# Patient Record
Sex: FEMALE | Race: WHITE | Employment: UNEMPLOYED | URBAN - METROPOLITAN AREA
[De-identification: names, ages, dates, MRNs, and addresses within clinical notes are randomized per-mention and may not be internally consistent; named-entity substitution may affect disease eponyms.]

---

## 2019-04-01 ENCOUNTER — OFFICE VISIT (OUTPATIENT)
Dept: CARDIOLOGY CLINIC | Age: 39
End: 2019-04-01

## 2019-04-01 VITALS
DIASTOLIC BLOOD PRESSURE: 51 MMHG | HEIGHT: 66 IN | SYSTOLIC BLOOD PRESSURE: 117 MMHG | HEART RATE: 77 BPM | BODY MASS INDEX: 26.36 KG/M2 | WEIGHT: 164 LBS

## 2019-04-01 DIAGNOSIS — I47.1 SVT (SUPRAVENTRICULAR TACHYCARDIA) (HCC): ICD-10-CM

## 2019-04-01 DIAGNOSIS — Z01.810 PREOP CARDIOVASCULAR EXAM: ICD-10-CM

## 2019-04-01 DIAGNOSIS — R00.0 TACHYCARDIA: Primary | ICD-10-CM

## 2019-04-01 NOTE — PROGRESS NOTES
HISTORY OF PRESENT ILLNESS Nikhil Max is a 41 y.o. female. 4/19 History of tachycardia for many years but for the last 3 years or so, episodes come every 6 months and last less than 5 minutes. She was advised ablation in the remote past but has never gotten it. She tries to do vagal maneuvers and they help sometimes. Patient denies significant chest pain, SOB, edema, dizziness New Patient The history is provided by the patient. Pertinent negatives include no chest pain, no headaches and no shortness of breath. Palpitations The history is provided by the patient. This is a new problem. The current episode started more than 1 week ago (yrs, see comments). Pertinent negatives include no fever, no malaise/fatigue, no chest pain, no claudication, no orthopnea, no PND, no nausea, no vomiting, no headaches, no dizziness, no cough and no shortness of breath. Review of Systems Constitutional: Negative for chills, fever, malaise/fatigue and weight loss. HENT: Negative for nosebleeds. Eyes: Negative for discharge. Respiratory: Negative for cough, shortness of breath and wheezing. Cardiovascular: Positive for palpitations. Negative for chest pain, orthopnea, claudication, leg swelling and PND. Gastrointestinal: Negative for diarrhea, nausea and vomiting. Genitourinary: Negative for dysuria and hematuria. Musculoskeletal: Negative for joint pain. Skin: Negative for rash. Neurological: Negative for dizziness, seizures, loss of consciousness and headaches. Endo/Heme/Allergies: Negative for polydipsia. Does not bruise/bleed easily. Psychiatric/Behavioral: Negative for depression and substance abuse. The patient does not have insomnia. Allergies Allergen Reactions  Hydrocodone Itching Past Medical History:  
Diagnosis Date  ADD (attention deficit disorder with hyperactivity)   
 failed adderral  
 Depression   
 paxil and welbutrin  Murmur Family History Problem Relation Age of Onset  Other Mother   
     diverticulitis  Heart Attack Maternal Uncle  Heart Attack Maternal Grandmother  Heart Attack Maternal Uncle  Heart Attack Maternal Uncle  Heart Attack Maternal Uncle  Alcohol abuse Father Social History Tobacco Use  Smoking status: Never Smoker  Smokeless tobacco: Never Used Substance Use Topics  Alcohol use: Not Currently Comment: rare  Drug use: No  
  
 
Current Outpatient Medications Medication Sig  clonazepam (KLONOPIN) 0.5 mg tablet Take 1 Tab by mouth nightly as needed. No current facility-administered medications for this visit. No past surgical history on file. Visit Vitals /51 Pulse 77 Ht 5' 6\" (1.676 m) Wt 74.4 kg (164 lb) BMI 26.47 kg/m² Diagnostic Studies: 
I have reviewed the relevant tests done on the patient and show as follows EKG tracings reviewed by me today. EKG Results Procedure 720 Value Units Date/Time AMB POC EKG ROUTINE W/ 12 LEADS, INTER & REP [[id number]] Resulted:  04/01/19 1321 Order Status:  Completed Updated:  04/01/19 1328 XR Results (most recent): No results found for this or any previous visit. No flowsheet data found. Ms. Juan Pablo Lockett has a reminder for a \"due or due soon\" health maintenance. I have asked that she contact her primary care provider for follow-up on this health maintenance. Physical Exam  
Constitutional: She is oriented to person, place, and time. She appears well-developed and well-nourished. No distress. HENT:  
Head: Normocephalic and atraumatic. Mouth/Throat: Normal dentition. Eyes: Right eye exhibits no discharge. Left eye exhibits no discharge. No scleral icterus. Neck: Neck supple. No JVD present. Carotid bruit is not present. No thyromegaly present.   
Cardiovascular: Normal rate, regular rhythm, S1 normal, S2 normal, normal heart sounds and intact distal pulses. Exam reveals no gallop and no friction rub. No murmur heard. Pulmonary/Chest: Effort normal and breath sounds normal. She has no wheezes. She has no rales. Abdominal: Soft. She exhibits no mass. There is no tenderness. Musculoskeletal: She exhibits no edema. Lymphadenopathy:  
     Right cervical: No superficial cervical adenopathy present. Left cervical: No superficial cervical adenopathy present. Neurological: She is alert and oriented to person, place, and time. Skin: Skin is warm and dry. No rash noted. Psychiatric: She has a normal mood and affect. Her behavior is normal.  
 
 
ASSESSMENT and PLAN Cleared from cardiac view with the understanding that patient will have mild risk for this surgery. Patient has good functional capacity, normal EKG. She also tells me that his stress test was normal in 2015 approximately. Clinical exam is normal.  No further cardiac workup is recommended. She does have a history of SVT intermittently for which she has not taken any medication for a long time. If any SVT is noted in perioperative period, may consider IV adenosine. Diagnoses and all orders for this visit: 
 
1. Tachycardia -     AMB POC EKG ROUTINE W/ 12 LEADS, INTER & REP 2. SVT (supraventricular tachycardia) ([address] 3. Preop cardiovascular exam 
 
 
 
Pertinent laboratory and test data reviewed and discussed with patient. See patient instructions also for other medical advice given There are no discontinued medications. Follow-up and Dispositions · Return if symptoms worsen or fail to improve, for post test.

## 2019-09-20 PROBLEM — Z01.810 PREOP CARDIOVASCULAR EXAM: Status: RESOLVED | Noted: 2019-04-01 | Resolved: 2019-09-20

## 2020-10-02 ENCOUNTER — HOSPITAL ENCOUNTER (EMERGENCY)
Age: 40
Discharge: HOME | End: 2020-10-02
Attending: FAMILY MEDICINE
Payer: COMMERCIAL

## 2020-10-02 ENCOUNTER — APPOINTMENT (OUTPATIENT)
Dept: GENERAL RADIOLOGY | Age: 40
End: 2020-10-02
Attending: FAMILY MEDICINE
Payer: COMMERCIAL

## 2020-10-02 VITALS
HEART RATE: 107 BPM | SYSTOLIC BLOOD PRESSURE: 119 MMHG | WEIGHT: 160 LBS | RESPIRATION RATE: 18 BRPM | OXYGEN SATURATION: 100 % | DIASTOLIC BLOOD PRESSURE: 79 MMHG | HEIGHT: 66 IN | BODY MASS INDEX: 25.71 KG/M2 | TEMPERATURE: 98.2 F

## 2020-10-02 DIAGNOSIS — I47.1 SVT (SUPRAVENTRICULAR TACHYCARDIA) (HCC): Primary | ICD-10-CM

## 2020-10-02 LAB
ANION GAP SERPL CALC-SCNC: 9 MMOL/L
BASOPHILS # BLD: 0 K/UL
BASOPHILS NFR BLD: 0 %
BUN SERPL-MCNC: 10 MG/DL (ref 9–21)
BUN/CREAT SERPL: 14
CA-I BLD-MCNC: 9.7 MG/DL (ref 8.5–10.5)
CHLORIDE SERPL-SCNC: 110 MMOL/L (ref 94–111)
CO2 SERPL-SCNC: 21 MMOL/L (ref 21–33)
CREAT SERPL-MCNC: 0.7 MG/DL (ref 0.7–1.2)
EOSINOPHIL # BLD: 0 K/UL
EOSINOPHIL NFR BLD: 0 %
ERYTHROCYTE [DISTWIDTH] IN BLOOD BY AUTOMATED COUNT: 14.2 % (ref 11.6–14.5)
GLUCOSE SERPL-MCNC: 186 MG/DL (ref 70–110)
HCT VFR BLD AUTO: 41.4 % (ref 35–45)
HGB BLD-MCNC: 13.9 G/DL (ref 12–16)
IMM GRANULOCYTES # BLD AUTO: 0 K/UL
IMM GRANULOCYTES NFR BLD AUTO: 0 %
LYMPHOCYTES # BLD: 1.4 K/UL
LYMPHOCYTES NFR BLD: 6 %
MAGNESIUM SERPL-MCNC: 2.2 MG/DL (ref 1.7–2.8)
MCH RBC QN AUTO: 30.8 PG (ref 24–34)
MCHC RBC AUTO-ENTMCNC: 33.6 G/DL (ref 31–37)
MCV RBC AUTO: 91.6 FL (ref 74–97)
MONOCYTES # BLD: 1.8 K/UL
MONOCYTES NFR BLD: 8 %
NEUTS SEG # BLD: 19.4 K/UL
NEUTS SEG NFR BLD: 86 %
PLATELET # BLD AUTO: 313 K/UL (ref 135–420)
PMV BLD AUTO: 10.7 FL (ref 9.2–11.8)
POTASSIUM SERPL-SCNC: 4.3 MMOL/L (ref 3.2–5.1)
RBC # BLD AUTO: 4.52 M/UL (ref 4.2–5.3)
RBC MORPH BLD: ABNORMAL
SODIUM SERPL-SCNC: 140 MMOL/L (ref 135–145)
TROPONIN I SERPL-MCNC: 0.05 NG/ML (ref 0.02–0.05)
WBC # BLD AUTO: 22.6 K/UL (ref 4.6–13.2)

## 2020-10-02 PROCEDURE — 83735 ASSAY OF MAGNESIUM: CPT

## 2020-10-02 PROCEDURE — 84484 ASSAY OF TROPONIN QUANT: CPT

## 2020-10-02 PROCEDURE — 85025 COMPLETE CBC W/AUTO DIFF WBC: CPT

## 2020-10-02 PROCEDURE — 93005 ELECTROCARDIOGRAM TRACING: CPT

## 2020-10-02 PROCEDURE — 96374 THER/PROPH/DIAG INJ IV PUSH: CPT

## 2020-10-02 PROCEDURE — 99284 EMERGENCY DEPT VISIT MOD MDM: CPT

## 2020-10-02 PROCEDURE — 80048 BASIC METABOLIC PNL TOTAL CA: CPT

## 2020-10-02 PROCEDURE — 71045 X-RAY EXAM CHEST 1 VIEW: CPT

## 2020-10-02 PROCEDURE — 74011250636 HC RX REV CODE- 250/636: Performed by: FAMILY MEDICINE

## 2020-10-02 RX ORDER — ATENOLOL 25 MG/1
25 TABLET ORAL DAILY
Qty: 30 TAB | Refills: 0 | Status: SHIPPED | OUTPATIENT
Start: 2020-10-02 | End: 2020-10-02 | Stop reason: SDUPTHER

## 2020-10-02 RX ORDER — ALBUTEROL SULFATE 90 UG/1
2 AEROSOL, METERED RESPIRATORY (INHALATION)
COMMUNITY
Start: 2018-10-19

## 2020-10-02 RX ORDER — ATENOLOL 25 MG/1
25 TABLET ORAL DAILY
Qty: 30 TAB | Refills: 0 | Status: SHIPPED | OUTPATIENT
Start: 2020-10-02

## 2020-10-02 RX ORDER — ADENOSINE 3 MG/ML
12 INJECTION, SOLUTION INTRAVENOUS
Status: COMPLETED | OUTPATIENT
Start: 2020-10-02 | End: 2020-10-02

## 2020-10-02 RX ADMIN — SODIUM CHLORIDE 1000 ML: 9 INJECTION, SOLUTION INTRAVENOUS at 18:30

## 2020-10-02 RX ADMIN — ADENOSINE 12 MG: 3 INJECTION INTRAVENOUS at 18:29

## 2020-10-02 NOTE — ED PROVIDER NOTES
EMERGENCY DEPARTMENT HISTORY AND PHYSICAL EXAM      Date: 10/2/2020  Patient Name: Rosaura Serrano    History of Presenting Illness     Chief Complaint   Patient presents with    Irregular Heart Beat       History Provided By: Patient    HPI: Rosaura Serrano, 45 y.o. female with a past medical history significant SVT presents to the ED with cc of irregular heart beat. Pt states she was diagnosed with bronchitis yesterday and was given Prednisone. Today, pt notes her heart beat starting feeling irregular and fast, prompting her visit to the ED. Pt states she has had these episodes before, but notes this one lasted 5 hours. Pt also notes of associated light-headedness and dizziness that has since subsided PTA. Pt notes she was tested negative for COVID-19 as well. There are no other complaints, changes, or physical findings at this time. PCP: Gianluca Carter MD    Current Outpatient Medications   Medication Sig Dispense Refill    albuterol (PROVENTIL HFA, VENTOLIN HFA, PROAIR HFA) 90 mcg/actuation inhaler Take 2 Puffs by inhalation every six (6) hours as needed.  atenoloL (TENORMIN) 25 mg tablet Take 1 Tab by mouth daily. 30 Tab 0    clonazepam (KLONOPIN) 0.5 mg tablet Take 1 Tab by mouth nightly as needed. 15 Tab 1       Past History     Past Medical History:  Past Medical History:   Diagnosis Date    ADD (attention deficit disorder with hyperactivity)     failed adderral    Depression     paxil and welbutrin    Murmur        Past Surgical History:  History reviewed. No pertinent surgical history.     Family History:  Family History   Problem Relation Age of Onset    Other Mother         diverticulitis    Heart Attack Maternal Uncle     Heart Attack Maternal Grandmother     Heart Attack Maternal Uncle     Heart Attack Maternal Uncle     Heart Attack Maternal Uncle     Alcohol abuse Father        Social History:  Social History     Tobacco Use    Smoking status: Never Smoker    Smokeless tobacco: Never Used   Substance Use Topics    Alcohol use: Not Currently     Comment: rare    Drug use: No       Allergies: Allergies   Allergen Reactions    Hydrocodone Itching         Review of Systems     Review of Systems   Constitutional: Negative for diaphoresis, fatigue and fever. HENT: Negative for congestion, sinus pain and sore throat. Eyes: Negative for pain, redness and visual disturbance. Respiratory: Negative for cough, shortness of breath and wheezing. Cardiovascular: Positive for palpitations. Gastrointestinal: Negative for abdominal pain, diarrhea, nausea and vomiting. Endocrine: Negative for polydipsia, polyphagia and polyuria. Genitourinary: Negative for dysuria, frequency and hematuria. Musculoskeletal: Negative for back pain, neck pain and neck stiffness. Skin: Negative for color change and rash. Neurological: Positive for dizziness and light-headedness. Negative for weakness and headaches. Psychiatric/Behavioral: Negative for confusion. The patient is not hyperactive. Physical Exam     Physical Exam  Constitutional:       General: She is awake. Appearance: Normal appearance. She is well-developed, well-groomed and normal weight. HENT:      Head: Normocephalic and atraumatic. Eyes:      Extraocular Movements: Extraocular movements intact. Pupils: Pupils are equal, round, and reactive to light. Neck:      Musculoskeletal: Full passive range of motion without pain, normal range of motion and neck supple. Cardiovascular:      Rate and Rhythm: Tachycardia present. Heart sounds: Normal heart sounds. Pulmonary:      Effort: Pulmonary effort is normal.      Breath sounds: Normal breath sounds and air entry. Abdominal:      General: Abdomen is flat. Palpations: Abdomen is soft. Skin:     General: Skin is warm and dry. Neurological:      General: No focal deficit present.       Mental Status: She is alert and oriented to person, place, and time. Psychiatric:         Attention and Perception: Attention and perception normal.         Mood and Affect: Affect normal.         Speech: Speech normal.         Behavior: Behavior normal. Behavior is cooperative. Thought Content: Thought content normal.         Cognition and Memory: Cognition and memory normal.         Judgment: Judgment normal.         Diagnostic Study Results     Labs -  Recent Results (from the past 24 hour(s))   CBC WITH AUTOMATED DIFF    Collection Time: 10/02/20  6:30 PM   Result Value Ref Range    WBC 22.6 (H) 4.6 - 13.2 K/uL    RBC 4.52 4.20 - 5.30 M/uL    HGB 13.9 12.0 - 16.0 g/dL    HCT 41.4 35.0 - 45.0 %    MCV 91.6 74.0 - 97.0 FL    MCH 30.8 24.0 - 34.0 PG    MCHC 33.6 31.0 - 37.0 g/dL    RDW 14.2 11.6 - 14.5 %    PLATELET 631 804 - 151 K/uL    MPV 10.7 9.2 - 11.8 FL    NEUTROPHILS 86 %    LYMPHOCYTES 6 %    MONOCYTES 8 %    EOSINOPHILS 0 %    BASOPHILS 0 %    IMMATURE GRANULOCYTES 0 %    ABS. NEUTROPHILS 19.4 K/UL    ABS. LYMPHOCYTES 1.4 K/UL    ABS. MONOCYTES 1.8 K/UL    ABS. EOSINOPHILS 0.0 K/UL    ABS. BASOPHILS 0.0 K/UL    ABS. IMM.  GRANS. 0.0 K/UL    RBC COMMENTS Normocytic, Normochromic     METABOLIC PANEL, BASIC    Collection Time: 10/02/20  6:30 PM   Result Value Ref Range    Sodium 140 135 - 145 mmol/L    Potassium 4.3 3.2 - 5.1 mmol/L    Chloride 110 94 - 111 mmol/L    CO2 21 21 - 33 mmol/L    Anion gap 9 mmol/L    Glucose 186 (H) 70 - 110 mg/dL    BUN 10 9 - 21 mg/dL    Creatinine 0.70 0.70 - 1.20 mg/dL    BUN/Creatinine ratio 14      GFR est AA >60 ml/min/1.73m2    GFR est non-AA >60 ml/min/1.73m2    Calcium 9.7 8.5 - 10.5 mg/dL   TROPONIN I    Collection Time: 10/02/20  6:30 PM   Result Value Ref Range    Troponin-I, Qt. 0.05 0.02 - 0.05 ng/mL   MAGNESIUM    Collection Time: 10/02/20  6:30 PM   Result Value Ref Range    Magnesium 2.2 1.7 - 2.8 mg/dL     Radiologic Studies -   [unfilled]  CT Results  (Last 48 hours)    None        CXR Results  (Last 48 hours)    None          Medical Decision Making and ED Course   I am the first provider for this patient. I reviewed the vital signs, available nursing notes, past medical history, past surgical history, family history and social history. Vital Signs-Reviewed the patient's vital signs. No data found. EKG interpretation: (Preliminary)-1809  Rhythm: Supraventricular tachycardia; and regular . Rate (approx.): 195; Axis: normal; VA interval: normal; QRS interval: prolonged; ST/T wave: depressed; Other findings: SST depression, probably rate related. Records Reviewed: Nursing Notes and Old Medical Records    The patient presents with a differential diagnosis of SVT, anemai      Provider Notes (Medical Decision Making):     [facility]       ED Course:   Initial assessment performed. The patients presenting problems have been discussed, and they are in agreement with the care plan formulated and outlined with them. I have encouraged them to ask questions as they arise throughout their visit. 1930 -patient presented with 5 hours of palpitations. She has a history of SVT. Her heart rate was 195 on arrival.  She was in SVT. Vagal maneuvers were attempted without resolution of her symptoms. She was then given Adenocard 12 mg and she converted into a sinus tachycardia which further resolved into a normal sinus rhythm. Her lab evaluation was unremarkable. Since she has a previous history and is on no rate control meds she was given a prescription for atenolol. She needs a follow-up with her PCP. Procedures       Vladimir Howard MD  Chemical Cardioversion    Date/Time: 10/3/2020 10:16 AM  Performed by: Seth Nguyen MD  Authorized by: Seth Nguyen MD     Consent:     Consent obtained:  Verbal    Consent given by:  Patient    Risks discussed:  Pain  Indications:     Indications:  SVT  Anesthesia (see MAR for exact dosages):      Anesthesia method:  None  Post-procedure details:     Patient tolerance of procedure: Tolerated well, no immediate complications  Comments:      Pt was given Adenocard 12mg and her SVT converted to a Sinus Tachycardia. CRITICAL CARE NOTE :  6:35 PM  Amount of Critical Care Time: 30 minutes    IMPENDING DETERIORATION -Cardiovascular  ASSOCIATED RISK FACTORS - Dysrhythmia  MANAGEMENT- Bedside Assessment  INTERPRETATION -  ECG  INTERVENTIONS - cardioversion  TREATMENT RESPONSE -Improved  PERFORMED BY - Self    NOTES   :  I have spent critical care time involved in lab review, consultations with specialist, family decision- making, bedside attention and documentation. This time excludes time spent in any separate billed procedures. During this entire length of time I was immediately available to the patient . Rach Monique MD        Disposition       Discharged     DISCHARGE PLAN:  1. Start Atenolol. Current Discharge Medication List      CONTINUE these medications which have NOT CHANGED    Details   albuterol (PROVENTIL HFA, VENTOLIN HFA, PROAIR HFA) 90 mcg/actuation inhaler Take 2 Puffs by inhalation every six (6) hours as needed. clonazepam (KLONOPIN) 0.5 mg tablet Take 1 Tab by mouth nightly as needed. Qty: 15 Tab, Refills: 1    Associated Diagnoses: FH: CAD (coronary artery disease); Adjustment disorder; Stress           2. Follow-up Information     Follow up With Specialties Details Why Contact Info    Melva Diggs MD Family Medicine In 1 week  [address] [address]  [phone]          3. Return to ED if worse     Diagnosis     Clinical Impression:   1. SVT (supraventricular tachycardia) ([address]        Attestations:  By signing my name below, Jazmin Bolden, attest that this documentation has been prepared under the direction and in presence of Dr. Jay Jay Batista on 10/03/20.  Electronically signed: Kacy Gutierrez, 10/03/20, 6:41 PM      Rach Monique MD    Please note that this dictation was completed with Dragon, the computer voice recognition software. Quite often unanticipated grammatical, syntax, homophones, and other interpretive errors are inadvertently transcribed by the computer software. Please disregard these errors. Please excuse any errors that have escaped final proofreading. Thank you.

## 2020-10-02 NOTE — Clinical Note
[facility] [facility] EMERGENCY DEPT 
[facility] [id number] 
[phone] Work/School Note Date: 10/2/2020 To Whom It May concern: 
 
 
Jonn Bloch was seen and treated today in the emergency room by the following provider(s): 
Attending Provider: Ct Li MD. Jonn Bloch is excused from work/school on 10/02/20. She is clear to return to work/school on 10/03/20. Sincerely, Abhilash Chen MD

## 2020-10-02 NOTE — ED TRIAGE NOTES
Has had a cough, so saw PCP yesterday and was dx with bronchitis - negative for covid - and was given prednisone. Today has had a run of fast heart rate after taking the prednisone x 5 hours. States that she has a hx of tachycardia.

## 2020-10-04 LAB
ATRIAL RATE: 195 BPM
CALCULATED P AXIS, ECG09: 0 DEGREES
CALCULATED R AXIS, ECG10: 7 DEGREES
CALCULATED T AXIS, ECG11: 53 DEGREES
DIAGNOSIS, 93000: NORMAL
Q-T INTERVAL, ECG07: 267 MS
QRS DURATION, ECG06: 84 MS
QTC CALCULATION (BEZET), ECG08: 481 MS
VENTRICULAR RATE, ECG03: 195 BPM

## 2022-03-19 PROBLEM — R00.0 TACHYCARDIA: Status: ACTIVE | Noted: 2019-04-01

## 2022-03-20 PROBLEM — I47.1 SVT (SUPRAVENTRICULAR TACHYCARDIA) (HCC): Status: ACTIVE | Noted: 2019-04-01

## 2022-03-20 PROBLEM — I47.10 SVT (SUPRAVENTRICULAR TACHYCARDIA) (HCC): Status: ACTIVE | Noted: 2019-04-01

## 2023-08-31 ENCOUNTER — HOSPITAL ENCOUNTER (OUTPATIENT)
Facility: HOSPITAL | Age: 43
Discharge: HOME | End: 2023-08-31
Payer: COMMERCIAL

## 2023-08-31 DIAGNOSIS — N20.0 KIDNEY STONES: ICD-10-CM

## 2023-08-31 PROCEDURE — 74018 RADEX ABDOMEN 1 VIEW: CPT

## 2024-10-22 ENCOUNTER — OFFICE VISIT (OUTPATIENT)
Facility: CLINIC | Age: 44
End: 2024-10-22
Payer: COMMERCIAL

## 2024-10-22 ENCOUNTER — HOSPITAL ENCOUNTER (OUTPATIENT)
Facility: HOSPITAL | Age: 44
Discharge: HOME | End: 2024-10-25
Payer: COMMERCIAL

## 2024-10-22 VITALS
BODY MASS INDEX: 27.58 KG/M2 | RESPIRATION RATE: 16 BRPM | TEMPERATURE: 97.8 F | OXYGEN SATURATION: 99 % | SYSTOLIC BLOOD PRESSURE: 116 MMHG | HEIGHT: 66 IN | WEIGHT: 171.6 LBS | HEART RATE: 86 BPM | DIASTOLIC BLOOD PRESSURE: 76 MMHG

## 2024-10-22 DIAGNOSIS — F33.40 RECURRENT MAJOR DEPRESSIVE DISORDER, IN REMISSION (HCC): ICD-10-CM

## 2024-10-22 DIAGNOSIS — F41.9 ANXIETY: ICD-10-CM

## 2024-10-22 DIAGNOSIS — Z13.220 SCREENING FOR LIPID DISORDERS: ICD-10-CM

## 2024-10-22 DIAGNOSIS — R15.9 INCONTINENCE OF FECES, UNSPECIFIED FECAL INCONTINENCE TYPE: ICD-10-CM

## 2024-10-22 DIAGNOSIS — R15.9 INCONTINENCE OF FECES, UNSPECIFIED FECAL INCONTINENCE TYPE: Primary | ICD-10-CM

## 2024-10-22 DIAGNOSIS — Z76.89 ENCOUNTER TO ESTABLISH CARE: ICD-10-CM

## 2024-10-22 PROBLEM — Z87.442 HISTORY OF KIDNEY STONES: Status: ACTIVE | Noted: 2024-10-22

## 2024-10-22 PROBLEM — J45.20 MILD INTERMITTENT ASTHMA WITHOUT COMPLICATION: Status: ACTIVE | Noted: 2024-10-22

## 2024-10-22 PROBLEM — J45.30 MILD PERSISTENT ASTHMA WITHOUT COMPLICATION: Status: ACTIVE | Noted: 2024-10-22

## 2024-10-22 PROBLEM — K57.90 DIVERTICULOSIS: Status: ACTIVE | Noted: 2024-10-22

## 2024-10-22 LAB
ALBUMIN SERPL-MCNC: 3.9 G/DL (ref 3.4–5)
ALBUMIN/GLOB SERPL: 1 (ref 0.8–1.7)
ALP SERPL-CCNC: 41 U/L (ref 45–117)
ALT SERPL-CCNC: 24 U/L (ref 13–56)
ANION GAP SERPL CALC-SCNC: 4 MMOL/L (ref 3–18)
AST SERPL-CCNC: 15 U/L (ref 10–38)
BASOPHILS # BLD: 0.1 K/UL (ref 0–0.1)
BASOPHILS NFR BLD: 1 % (ref 0–2)
BILIRUB SERPL-MCNC: 0.5 MG/DL (ref 0.2–1)
BUN SERPL-MCNC: 11 MG/DL (ref 7–18)
BUN/CREAT SERPL: 15 (ref 12–20)
CALCIUM SERPL-MCNC: 9.6 MG/DL (ref 8.5–10.1)
CHLORIDE SERPL-SCNC: 104 MMOL/L (ref 100–111)
CHOLEST SERPL-MCNC: 223 MG/DL
CO2 SERPL-SCNC: 27 MMOL/L (ref 21–32)
CREAT SERPL-MCNC: 0.75 MG/DL (ref 0.6–1.3)
DIFFERENTIAL METHOD BLD: ABNORMAL
EOSINOPHIL # BLD: 0.5 K/UL (ref 0–0.4)
EOSINOPHIL NFR BLD: 7 % (ref 0–5)
ERYTHROCYTE [DISTWIDTH] IN BLOOD BY AUTOMATED COUNT: 15.1 % (ref 11.6–14.5)
GLOBULIN SER CALC-MCNC: 4.1 G/DL (ref 2–4)
GLUCOSE SERPL-MCNC: 95 MG/DL (ref 74–99)
HCT VFR BLD AUTO: 40.3 % (ref 35–45)
HDLC SERPL-MCNC: 60 MG/DL (ref 40–60)
HDLC SERPL: 3.7 (ref 0–5)
HGB BLD-MCNC: 12.7 G/DL (ref 12–16)
IMM GRANULOCYTES # BLD AUTO: 0 K/UL (ref 0–0.04)
IMM GRANULOCYTES NFR BLD AUTO: 0 % (ref 0–0.5)
LDLC SERPL CALC-MCNC: 141.6 MG/DL (ref 0–100)
LIPID PANEL: ABNORMAL
LYMPHOCYTES # BLD: 1.5 K/UL (ref 0.9–3.6)
LYMPHOCYTES NFR BLD: 22 % (ref 21–52)
MCH RBC QN AUTO: 28 PG (ref 24–34)
MCHC RBC AUTO-ENTMCNC: 31.5 G/DL (ref 31–37)
MCV RBC AUTO: 88.8 FL (ref 78–100)
MONOCYTES # BLD: 0.5 K/UL (ref 0.05–1.2)
MONOCYTES NFR BLD: 7 % (ref 3–10)
NEUTS SEG # BLD: 4.3 K/UL (ref 1.8–8)
NEUTS SEG NFR BLD: 63 % (ref 40–73)
NRBC # BLD: 0 K/UL (ref 0–0.01)
NRBC BLD-RTO: 0 PER 100 WBC
PLATELET # BLD AUTO: 290 K/UL (ref 135–420)
PMV BLD AUTO: 10.8 FL (ref 9.2–11.8)
POTASSIUM SERPL-SCNC: 4 MMOL/L (ref 3.5–5.5)
PROT SERPL-MCNC: 8 G/DL (ref 6.4–8.2)
RBC # BLD AUTO: 4.54 M/UL (ref 4.2–5.3)
SODIUM SERPL-SCNC: 135 MMOL/L (ref 136–145)
TRIGL SERPL-MCNC: 107 MG/DL
TSH SERPL DL<=0.05 MIU/L-ACNC: 1.91 UIU/ML (ref 0.36–3.74)
VLDLC SERPL CALC-MCNC: 21.4 MG/DL
WBC # BLD AUTO: 6.8 K/UL (ref 4.6–13.2)

## 2024-10-22 PROCEDURE — 36415 COLL VENOUS BLD VENIPUNCTURE: CPT

## 2024-10-22 PROCEDURE — 80053 COMPREHEN METABOLIC PANEL: CPT

## 2024-10-22 PROCEDURE — 85025 COMPLETE CBC W/AUTO DIFF WBC: CPT

## 2024-10-22 PROCEDURE — 80061 LIPID PANEL: CPT

## 2024-10-22 PROCEDURE — 99204 OFFICE O/P NEW MOD 45 MIN: CPT

## 2024-10-22 PROCEDURE — 84443 ASSAY THYROID STIM HORMONE: CPT

## 2024-10-22 SDOH — ECONOMIC STABILITY: FOOD INSECURITY: WITHIN THE PAST 12 MONTHS, THE FOOD YOU BOUGHT JUST DIDN'T LAST AND YOU DIDN'T HAVE MONEY TO GET MORE.: NEVER TRUE

## 2024-10-22 SDOH — ECONOMIC STABILITY: FOOD INSECURITY: WITHIN THE PAST 12 MONTHS, YOU WORRIED THAT YOUR FOOD WOULD RUN OUT BEFORE YOU GOT MONEY TO BUY MORE.: NEVER TRUE

## 2024-10-22 SDOH — ECONOMIC STABILITY: INCOME INSECURITY: HOW HARD IS IT FOR YOU TO PAY FOR THE VERY BASICS LIKE FOOD, HOUSING, MEDICAL CARE, AND HEATING?: NOT HARD AT ALL

## 2024-10-22 ASSESSMENT — PATIENT HEALTH QUESTIONNAIRE - PHQ9
SUM OF ALL RESPONSES TO PHQ9 QUESTIONS 1 & 2: 0
SUM OF ALL RESPONSES TO PHQ QUESTIONS 1-9: 0
SUM OF ALL RESPONSES TO PHQ QUESTIONS 1-9: 0
1. LITTLE INTEREST OR PLEASURE IN DOING THINGS: NOT AT ALL
2. FEELING DOWN, DEPRESSED OR HOPELESS: NOT AT ALL
SUM OF ALL RESPONSES TO PHQ QUESTIONS 1-9: 0
SUM OF ALL RESPONSES TO PHQ QUESTIONS 1-9: 0

## 2024-10-22 ASSESSMENT — ANXIETY QUESTIONNAIRES
3. WORRYING TOO MUCH ABOUT DIFFERENT THINGS: NOT AT ALL
IF YOU CHECKED OFF ANY PROBLEMS ON THIS QUESTIONNAIRE, HOW DIFFICULT HAVE THESE PROBLEMS MADE IT FOR YOU TO DO YOUR WORK, TAKE CARE OF THINGS AT HOME, OR GET ALONG WITH OTHER PEOPLE: NOT DIFFICULT AT ALL
4. TROUBLE RELAXING: NOT AT ALL
2. NOT BEING ABLE TO STOP OR CONTROL WORRYING: NOT AT ALL
5. BEING SO RESTLESS THAT IT IS HARD TO SIT STILL: NOT AT ALL
7. FEELING AFRAID AS IF SOMETHING AWFUL MIGHT HAPPEN: NOT AT ALL
1. FEELING NERVOUS, ANXIOUS, OR ON EDGE: NOT AT ALL
GAD7 TOTAL SCORE: 0
6. BECOMING EASILY ANNOYED OR IRRITABLE: NOT AT ALL

## 2024-10-22 ASSESSMENT — ENCOUNTER SYMPTOMS: SHORTNESS OF BREATH: 0

## 2024-10-22 NOTE — PROGRESS NOTES
Marilee Howe is a 44 y.o. female is seen on 10/22/2024 for New Patient, Weight Loss, and GI Problem    Assessment & Plan:     1. Incontinence of feces, unspecified fecal incontinence type  Assessment & Plan:  Chronic; worsening   Refer to gastroenterology for further evaluation   Labs ordered today   Awaiting results  Orders:  -     CBC with Auto Differential; Future  -     Comprehensive Metabolic Panel; Future  -     External Referral To Gastroenterology  2. Anxiety  Assessment & Plan:  Uncontrolled   Labs ordered today   Awaiting results   Will discuss potential treatment options during next office visit  Orders:  -     CBC with Auto Differential; Future  -     Comprehensive Metabolic Panel; Future  -     TSH; Future  3. Recurrent major depressive disorder, in remission (HCC)  Assessment & Plan:  Uncontrolled   Labs ordered today   Awaiting results   Will discuss potential treatment options during next office visit  Orders:  -     CBC with Auto Differential; Future  -     Comprehensive Metabolic Panel; Future  -     TSH; Future  4. Screening for lipid disorders  -     Lipid Panel; Future  5. Encounter to establish care    Follow-up and Dispositions    Return in about 2 weeks (around 11/5/2024).       Subjective:     HPI  Pt is here today to establish care and c/o fecal incontinence, abdominal pain, and would also like to discuss possible weight loss treatment   Pt reports a history diverticulosis, kidney stones, depression, and anxiety    Fecal Incontinence:   Started after having children   Reports history of anal sphincter repair; however, incontinence has been worsening over the last couple of months   Pt reports that certain foods can inconsistently cause abdominal pain and diarrhea  States that abdominal pain does improve after having bowel movement   Can correlate abdominal pain with certain foods; however, does reports symptoms of uncontrolled anxiety  Pt states that she has never been treated for

## 2024-10-22 NOTE — PROGRESS NOTES
Marilee Howe presents today for   Chief Complaint   Patient presents with    New Patient    Weight Loss    GI Problem       Current/Previous PCP/Specialists: Dr. Daniels  Previous PCP: Dr. Daniels  Reason for switching: Dr retired  Any speciality providers:     Is someone accompanying this pt? No    Is the patient using any DME equipment during OV? No    Depression Screening:      10/22/2024    10:29 AM   PHQ-9 Questionaire   Little interest or pleasure in doing things 0   Feeling down, depressed, or hopeless 0   PHQ-9 Total Score 0        BRADEN 7-Anxiety       10/22/2024    10:29 AM   BRADEN-7 SCREENING   Feeling nervous, anxious, or on edge Not at all   Not being able to stop or control worrying Not at all   Worrying too much about different things Not at all   Trouble relaxing Not at all   Being so restless that it is hard to sit still Not at all   Becoming easily annoyed or irritable Not at all   Feeling afraid as if something awful might happen Not at all   BRADEN-7 Total Score 0   How difficult have these problems made it for you to do your work, take care of things at home, or get along with other people? Not difficult at all        Travel Screening:    Travel Screening     No screening recorded since 10/21/24 0000       Travel History   Travel since 09/22/24    No documented travel since 09/22/24          Health Maintenance reviewed and discussed and ordered per Provider.  Transportation Needs: Unknown (10/22/2024)    PRAPARE - Transportation     Lack of Transportation (Medical): Not on file     Lack of Transportation (Non-Medical): No      Food Insecurity: No Food Insecurity (10/22/2024)    Hunger Vital Sign     Worried About Running Out of Food in the Last Year: Never true     Ran Out of Food in the Last Year: Never true     Financial Resource Strain: Low Risk  (10/22/2024)    Overall Financial Resource Strain (CARDIA)     Difficulty of Paying Living Expenses: Not hard at all     Housing Stability: Unknown

## 2024-10-23 PROBLEM — R15.9 INCONTINENCE OF FECES: Status: ACTIVE | Noted: 2024-10-23

## 2024-10-23 NOTE — ASSESSMENT & PLAN NOTE
Chronic; worsening   Refer to gastroenterology for further evaluation   Labs ordered today   Awaiting results

## 2024-10-23 NOTE — ASSESSMENT & PLAN NOTE
Uncontrolled   Labs ordered today   Awaiting results   Will discuss potential treatment options during next office visit

## 2024-10-25 ENCOUNTER — TELEPHONE (OUTPATIENT)
Age: 44
End: 2024-10-25

## 2024-10-25 NOTE — TELEPHONE ENCOUNTER
Received: Yesterday  Cedrick Kauffman MD Pinner, Robin; Evita Albrecht; Thelma Wood MA; Candida Obando RN; Marilee Moon LPN; 1 other  Chart reviewed.  Patient referred for fecal incontinence, change in bowel habits and diverticulosis.  Please schedule the patient for open access colonoscopy to evaluate the above.

## 2024-10-29 ENCOUNTER — OFFICE VISIT (OUTPATIENT)
Facility: CLINIC | Age: 44
End: 2024-10-29

## 2024-10-29 VITALS
HEIGHT: 66 IN | DIASTOLIC BLOOD PRESSURE: 81 MMHG | SYSTOLIC BLOOD PRESSURE: 117 MMHG | BODY MASS INDEX: 27.61 KG/M2 | TEMPERATURE: 97.6 F | OXYGEN SATURATION: 95 % | WEIGHT: 171.8 LBS | RESPIRATION RATE: 20 BRPM | HEART RATE: 90 BPM

## 2024-10-29 DIAGNOSIS — Z12.31 ENCOUNTER FOR SCREENING MAMMOGRAM FOR BREAST CANCER: ICD-10-CM

## 2024-10-29 DIAGNOSIS — E55.9 VITAMIN D DEFICIENCY: ICD-10-CM

## 2024-10-29 DIAGNOSIS — E78.00 PURE HYPERCHOLESTEROLEMIA: Primary | ICD-10-CM

## 2024-10-29 DIAGNOSIS — R15.9 INCONTINENCE OF FECES WITH FECAL URGENCY: ICD-10-CM

## 2024-10-29 DIAGNOSIS — R15.2 INCONTINENCE OF FECES WITH FECAL URGENCY: ICD-10-CM

## 2024-10-29 PROBLEM — R00.0 TACHYCARDIA: Status: RESOLVED | Noted: 2019-04-01 | Resolved: 2024-10-29

## 2024-10-29 RX ORDER — DOXYCYCLINE 100 MG/10ML
100 INJECTION, POWDER, LYOPHILIZED, FOR SOLUTION INTRAVENOUS DAILY
COMMUNITY
End: 2024-10-29

## 2024-10-29 SDOH — ECONOMIC STABILITY: FOOD INSECURITY: WITHIN THE PAST 12 MONTHS, THE FOOD YOU BOUGHT JUST DIDN'T LAST AND YOU DIDN'T HAVE MONEY TO GET MORE.: NEVER TRUE

## 2024-10-29 SDOH — ECONOMIC STABILITY: FOOD INSECURITY: WITHIN THE PAST 12 MONTHS, YOU WORRIED THAT YOUR FOOD WOULD RUN OUT BEFORE YOU GOT MONEY TO BUY MORE.: NEVER TRUE

## 2024-10-29 SDOH — ECONOMIC STABILITY: INCOME INSECURITY: HOW HARD IS IT FOR YOU TO PAY FOR THE VERY BASICS LIKE FOOD, HOUSING, MEDICAL CARE, AND HEATING?: NOT HARD AT ALL

## 2024-10-29 ASSESSMENT — PATIENT HEALTH QUESTIONNAIRE - PHQ9
SUM OF ALL RESPONSES TO PHQ QUESTIONS 1-9: 0
SUM OF ALL RESPONSES TO PHQ QUESTIONS 1-9: 0
2. FEELING DOWN, DEPRESSED OR HOPELESS: NOT AT ALL
SUM OF ALL RESPONSES TO PHQ9 QUESTIONS 1 & 2: 0
1. LITTLE INTEREST OR PLEASURE IN DOING THINGS: NOT AT ALL
SUM OF ALL RESPONSES TO PHQ QUESTIONS 1-9: 0
SUM OF ALL RESPONSES TO PHQ QUESTIONS 1-9: 0

## 2024-10-29 ASSESSMENT — ANXIETY QUESTIONNAIRES
4. TROUBLE RELAXING: NEARLY EVERY DAY
7. FEELING AFRAID AS IF SOMETHING AWFUL MIGHT HAPPEN: MORE THAN HALF THE DAYS
IF YOU CHECKED OFF ANY PROBLEMS ON THIS QUESTIONNAIRE, HOW DIFFICULT HAVE THESE PROBLEMS MADE IT FOR YOU TO DO YOUR WORK, TAKE CARE OF THINGS AT HOME, OR GET ALONG WITH OTHER PEOPLE: SOMEWHAT DIFFICULT
3. WORRYING TOO MUCH ABOUT DIFFERENT THINGS: SEVERAL DAYS
1. FEELING NERVOUS, ANXIOUS, OR ON EDGE: NOT AT ALL
5. BEING SO RESTLESS THAT IT IS HARD TO SIT STILL: NOT AT ALL
GAD7 TOTAL SCORE: 7
2. NOT BEING ABLE TO STOP OR CONTROL WORRYING: NOT AT ALL
6. BECOMING EASILY ANNOYED OR IRRITABLE: SEVERAL DAYS

## 2024-10-29 NOTE — PROGRESS NOTES
Marilee Howe presents today for   Chief Complaint   Patient presents with    Follow-up     Pt requesting to go over lab results         Is someone accompanying this pt? Yes/    Is the patient using any DME equipment during OV? no    Depression Screening:      10/29/2024     1:59 PM 10/22/2024    10:29 AM   PHQ-9 Questionaire   Little interest or pleasure in doing things 0 0   Feeling down, depressed, or hopeless 0 0   PHQ-9 Total Score 0 0        BRADEN 7-Anxiety       10/29/2024     1:59 PM 10/22/2024    10:29 AM   BRADEN-7 SCREENING   Feeling nervous, anxious, or on edge Not at all Not at all   Not being able to stop or control worrying Not at all Not at all   Worrying too much about different things Several days Not at all   Trouble relaxing Nearly every day Not at all   Being so restless that it is hard to sit still Not at all Not at all   Becoming easily annoyed or irritable Several days Not at all   Feeling afraid as if something awful might happen More than half the days Not at all   BRADEN-7 Total Score 7 0   How difficult have these problems made it for you to do your work, take care of things at home, or get along with other people? Somewhat difficult Not difficult at all        Travel Screening:    Travel Screening       Question Response    Have you been in contact with someone who was sick? No / Unsure    Do you have any of the following new or worsening symptoms? None of these    Have you traveled internationally or domestically in the last month? No          Travel History   Travel since 09/29/24    No documented travel since 09/29/24          Health Maintenance reviewed and discussed and ordered per Provider.  Transportation Needs: Unknown (10/29/2024)    PRAPARE - Transportation     Lack of Transportation (Medical): Not on file     Lack of Transportation (Non-Medical): No      Food Insecurity: No Food Insecurity (10/29/2024)    Hunger Vital Sign     Worried About Running Out of Food in the Last 
Antonio.  At this time, likely IBS.  Will try FOBMAP diet while waiting for GI.  Referral given again to specific provider requested by pt.  Advised pt to try pelvic floor exercise as well to help.    Orders:  -     External Referral To Gastroenterology    Orders Placed This Encounter   Procedures    DAVID JOÃO DIGITAL SCREEN BILATERAL [[id number]]     Standing Status:   Future     Standing Expiration Date:   10/29/2025     Order Specific Question:   Reason for exam:     Answer:   breast cancer screen    Vitamin D 25 Hydroxy     Standing Status:   Future     Standing Expiration Date:   10/29/2025    External Referral To Gastroenterology     Referral Priority:   Routine     Referral Type:   Eval and Treat     Referral Reason:   Specialty Services Required     Requested Specialty:   Gastroenterology     Number of Visits Requested:   1       Return in about 6 months (around 4/29/2025), or lab work, for extended visit 30 minutes.      I have discussed the diagnosis with the patient and the intended plan as seen in the above orders.  The patient has received an after-visit summary and questions were answered concerning future plans.  I have discussed medication side effects and warnings with the patient as well. I have reviewed the plan of care with the patient, accepted their input and they are in agreement with the treatment goals. Previous lab and imaging results were reviewed by me.     On this date 10/29/24 I have spent 45 minutes reviewing previous notes, test results and face to face with the patient for interview/exam, discussing working diagnosis and treatment plan as well as documenting on the day of the visit.       Parish Jamison MD  October 29, 2024

## 2024-10-29 NOTE — ASSESSMENT & PLAN NOTE
Pt was given referral by Antonio Villa.  At this time, likely IBS.  Will try FOBMAP diet while waiting for GI.  Referral given again to specific provider requested by pt.  Advised pt to try pelvic floor exercise as well to help.

## 2024-10-29 NOTE — ASSESSMENT & PLAN NOTE
Total chol 223, , HDL 60.  ASCVD risk of less than 1% over 10 years.  Diet and exercise for now.  Recommend psyllium fiber daily (Metamucil).  Will repeat lipid in 6 months.

## 2024-10-29 NOTE — ASSESSMENT & PLAN NOTE
Previously has vitamin D that was low.  No recent lab.  She is not on supplement.  Will repeat lab to check current level.  If low; will send supplement.

## 2024-10-30 ENCOUNTER — HOSPITAL ENCOUNTER (OUTPATIENT)
Facility: HOSPITAL | Age: 44
Discharge: HOME | End: 2024-11-02
Payer: COMMERCIAL

## 2024-10-30 DIAGNOSIS — E55.9 VITAMIN D DEFICIENCY: ICD-10-CM

## 2024-10-30 LAB — 25(OH)D3 SERPL-MCNC: 27.3 NG/ML (ref 30–100)

## 2024-10-30 PROCEDURE — 82306 VITAMIN D 25 HYDROXY: CPT

## 2024-10-30 PROCEDURE — 36415 COLL VENOUS BLD VENIPUNCTURE: CPT

## 2024-11-01 ENCOUNTER — OFFICE VISIT (OUTPATIENT)
Facility: CLINIC | Age: 44
End: 2024-11-01
Payer: COMMERCIAL

## 2024-11-01 DIAGNOSIS — E78.00 PURE HYPERCHOLESTEROLEMIA: Primary | ICD-10-CM

## 2024-11-01 DIAGNOSIS — Z71.3 WEIGHT LOSS COUNSELING, ENCOUNTER FOR: ICD-10-CM

## 2024-11-01 DIAGNOSIS — E66.3 OVERWEIGHT (BMI 25.0-29.9): Chronic | ICD-10-CM

## 2024-11-01 PROCEDURE — 99214 OFFICE O/P EST MOD 30 MIN: CPT | Performed by: STUDENT IN AN ORGANIZED HEALTH CARE EDUCATION/TRAINING PROGRAM

## 2024-11-01 RX ORDER — TIRZEPATIDE 2.5 MG/.5ML
2.5 INJECTION, SOLUTION SUBCUTANEOUS WEEKLY
Qty: 2 ML | Refills: 0 | Status: SHIPPED | OUTPATIENT
Start: 2024-11-01 | End: 2024-11-29

## 2024-11-01 SDOH — ECONOMIC STABILITY: FOOD INSECURITY: WITHIN THE PAST 12 MONTHS, YOU WORRIED THAT YOUR FOOD WOULD RUN OUT BEFORE YOU GOT MONEY TO BUY MORE.: NEVER TRUE

## 2024-11-01 SDOH — ECONOMIC STABILITY: FOOD INSECURITY: WITHIN THE PAST 12 MONTHS, THE FOOD YOU BOUGHT JUST DIDN'T LAST AND YOU DIDN'T HAVE MONEY TO GET MORE.: NEVER TRUE

## 2024-11-01 SDOH — ECONOMIC STABILITY: INCOME INSECURITY: HOW HARD IS IT FOR YOU TO PAY FOR THE VERY BASICS LIKE FOOD, HOUSING, MEDICAL CARE, AND HEATING?: NOT HARD AT ALL

## 2024-11-01 ASSESSMENT — PATIENT HEALTH QUESTIONNAIRE - PHQ9
SUM OF ALL RESPONSES TO PHQ QUESTIONS 1-9: 0
7. TROUBLE CONCENTRATING ON THINGS, SUCH AS READING THE NEWSPAPER OR WATCHING TELEVISION: NOT AT ALL
SUM OF ALL RESPONSES TO PHQ QUESTIONS 1-9: 0
3. TROUBLE FALLING OR STAYING ASLEEP: NOT AT ALL
1. LITTLE INTEREST OR PLEASURE IN DOING THINGS: NOT AT ALL
SUM OF ALL RESPONSES TO PHQ QUESTIONS 1-9: 0
10. IF YOU CHECKED OFF ANY PROBLEMS, HOW DIFFICULT HAVE THESE PROBLEMS MADE IT FOR YOU TO DO YOUR WORK, TAKE CARE OF THINGS AT HOME, OR GET ALONG WITH OTHER PEOPLE: NOT DIFFICULT AT ALL
8. MOVING OR SPEAKING SO SLOWLY THAT OTHER PEOPLE COULD HAVE NOTICED. OR THE OPPOSITE, BEING SO FIGETY OR RESTLESS THAT YOU HAVE BEEN MOVING AROUND A LOT MORE THAN USUAL: NOT AT ALL
5. POOR APPETITE OR OVEREATING: NOT AT ALL
2. FEELING DOWN, DEPRESSED OR HOPELESS: NOT AT ALL
6. FEELING BAD ABOUT YOURSELF - OR THAT YOU ARE A FAILURE OR HAVE LET YOURSELF OR YOUR FAMILY DOWN: NOT AT ALL
9. THOUGHTS THAT YOU WOULD BE BETTER OFF DEAD, OR OF HURTING YOURSELF: NOT AT ALL
4. FEELING TIRED OR HAVING LITTLE ENERGY: NOT AT ALL
SUM OF ALL RESPONSES TO PHQ QUESTIONS 1-9: 0
SUM OF ALL RESPONSES TO PHQ9 QUESTIONS 1 & 2: 0

## 2024-11-01 ASSESSMENT — ANXIETY QUESTIONNAIRES
4. TROUBLE RELAXING: NOT AT ALL
GAD7 TOTAL SCORE: 0
IF YOU CHECKED OFF ANY PROBLEMS ON THIS QUESTIONNAIRE, HOW DIFFICULT HAVE THESE PROBLEMS MADE IT FOR YOU TO DO YOUR WORK, TAKE CARE OF THINGS AT HOME, OR GET ALONG WITH OTHER PEOPLE: NOT DIFFICULT AT ALL
1. FEELING NERVOUS, ANXIOUS, OR ON EDGE: NOT AT ALL
2. NOT BEING ABLE TO STOP OR CONTROL WORRYING: NOT AT ALL
7. FEELING AFRAID AS IF SOMETHING AWFUL MIGHT HAPPEN: NOT AT ALL
6. BECOMING EASILY ANNOYED OR IRRITABLE: NOT AT ALL
3. WORRYING TOO MUCH ABOUT DIFFERENT THINGS: NOT AT ALL
5. BEING SO RESTLESS THAT IT IS HARD TO SIT STILL: NOT AT ALL

## 2024-11-01 NOTE — PROGRESS NOTES
Marilee Howe presents today for   Chief Complaint   Patient presents with    Discuss Medications         Is someone accompanying this pt? no    Is the patient using any DME equipment during OV? no    Depression Screenin/1/2024     8:28 AM 10/29/2024     1:59 PM 10/22/2024    10:29 AM   PHQ-9 Questionaire   Little interest or pleasure in doing things 0 0 0   Feeling down, depressed, or hopeless 0 0 0   Trouble falling or staying asleep, or sleeping too much 0     Feeling tired or having little energy 0     Poor appetite or overeating 0     Feeling bad about yourself - or that you are a failure or have let yourself or your family down 0     Trouble concentrating on things, such as reading the newspaper or watching television 0     Moving or speaking so slowly that other people could have noticed. Or the opposite - being so fidgety or restless that you have been moving around a lot more than usual 0     Thoughts that you would be better off dead, or of hurting yourself in some way 0     PHQ-9 Total Score 0 0 0   If you checked off any problems, how difficult have these problems made it for you to do your work, take care of things at home, or get along with other people? 0          BRADEN 7-Anxiety       2024     8:29 AM 10/29/2024     1:59 PM 10/22/2024    10:29 AM   BRADEN-7 SCREENING   Feeling nervous, anxious, or on edge Not at all Not at all Not at all   Not being able to stop or control worrying Not at all Not at all Not at all   Worrying too much about different things Not at all Several days Not at all   Trouble relaxing Not at all Nearly every day Not at all   Being so restless that it is hard to sit still Not at all Not at all Not at all   Becoming easily annoyed or irritable Not at all Several days Not at all   Feeling afraid as if something awful might happen Not at all More than half the days Not at all   BRADEN-7 Total Score 0 7 0   How difficult have these problems made it for you to do your

## 2024-11-01 NOTE — PROGRESS NOTES
Chronic Disease Follow up    Marilee Howe (: 1980) is a 44 y.o. female here for evaluation of the following chief concerns(s):  Discuss Medications (Weight loss medication )       ASSESSMENT/PLAN:  1. Pure hypercholesterolemia  Assessment & Plan:   Pt tolerating Metamucil well and is change diet to help.  Pt interest in Zepbound as she has tried gym and is at a wall with no improvement.  Adding zepbound to help, hoping that it will cover as she has good Rx coupon for 3 months trial with $25/month copay.    Orders:  -     tirzepatide-weight management (ZEPBOUND) 2.5 MG/0.5ML SOAJ subCUTAneous auto-injector pen; Inject 2.5 mg into the skin once a week for 28 days, Disp-2 mL, R-0Normal  2. Overweight (BMI 25.0-29.9)  Assessment & Plan:   In addition to diet and exercise pt would like to try Zepbound.  Will see if pt can get it..  Prescription given for 2.5 mg starting dose.  F/u in 4 weeks after able to get medication.   Orders:  -     tirzepatide-weight management (ZEPBOUND) 2.5 MG/0.5ML SOAJ subCUTAneous auto-injector pen; Inject 2.5 mg into the skin once a week for 28 days, Disp-2 mL, R-0Normal  3. Weight loss counseling, encounter for  Assessment & Plan:   Counseled dietary changes as it is the foundation of weight issue.  Pt is adjusting and would like Zepbound to get her to desired weight.  She is hitting a wall with diet and exercise.    Orders:  -     tirzepatide-weight management (ZEPBOUND) 2.5 MG/0.5ML SOAJ subCUTAneous auto-injector pen; Inject 2.5 mg into the skin once a week for 28 days, Disp-2 mL, R-0Normal    No orders of the defined types were placed in this encounter.      Return in about 4 weeks (around 2024), or as need for medication adjustment.    Patient agrees with plan as above and has no additional questions at this time.     SUBJECTIVE/OBJECTIVE:    Patient presents for follow up chronic disease     Overweight and high cholesterol.  Pt have tried diet and exercise and

## 2024-11-01 NOTE — ASSESSMENT & PLAN NOTE
In addition to diet and exercise pt would like to try Zepbound.  Will see if pt can get it..  Prescription given for 2.5 mg starting dose.  F/u in 4 weeks after able to get medication.

## 2024-11-01 NOTE — ASSESSMENT & PLAN NOTE
Counseled dietary changes as it is the foundation of weight issue.  Pt is adjusting and would like Zepbound to get her to desired weight.  She is hitting a wall with diet and exercise.

## 2024-11-01 NOTE — ASSESSMENT & PLAN NOTE
Pt tolerating Metamucil well and is change diet to help.  Pt interest in Zepbound as she has tried gym and is at a wall with no improvement.  Adding zepbound to help, hoping that it will cover as she has good Rx coupon for 3 months trial with $25/month copay.

## 2024-11-05 ENCOUNTER — TELEPHONE (OUTPATIENT)
Facility: CLINIC | Age: 44
End: 2024-11-05

## 2024-11-05 NOTE — TELEPHONE ENCOUNTER
Returned Mrs. Howe call verified pt by name and . Mrs. Howe stated that she has been working out at the gym, has seen seen by a bariatric clinic, all while having a body scan done with no successful weight loss results. I asked Mrs. Howe if she could call her insurance and ask which weight loss medication they will pay for and what are the requirements for the pt to get weight loss medication.  Mrs. Howe states that she will call her insurance and give our office a call once she get this information from her insurance.

## 2024-11-11 ENCOUNTER — HOSPITAL ENCOUNTER (OUTPATIENT)
Age: 44
Discharge: HOME | End: 2024-11-14
Payer: COMMERCIAL

## 2024-11-11 VITALS — HEIGHT: 66 IN | BODY MASS INDEX: 27.48 KG/M2 | WEIGHT: 171 LBS

## 2024-11-11 DIAGNOSIS — Z12.31 ENCOUNTER FOR SCREENING MAMMOGRAM FOR BREAST CANCER: ICD-10-CM

## 2024-11-11 PROCEDURE — 77063 BREAST TOMOSYNTHESIS BI: CPT

## 2024-11-12 DIAGNOSIS — E78.00 PURE HYPERCHOLESTEROLEMIA: ICD-10-CM

## 2024-11-12 DIAGNOSIS — E66.3 OVERWEIGHT (BMI 25.0-29.9): Chronic | ICD-10-CM

## 2024-11-12 DIAGNOSIS — Z71.3 WEIGHT LOSS COUNSELING, ENCOUNTER FOR: ICD-10-CM

## 2024-11-12 RX ORDER — SEMAGLUTIDE 0.5 MG/.5ML
0.5 INJECTION, SOLUTION SUBCUTANEOUS
Qty: 2 ML | Refills: 0 | Status: SHIPPED | OUTPATIENT
Start: 2024-11-12 | End: 2024-12-10

## 2024-11-18 DIAGNOSIS — R92.8 ABNORMAL MAMMOGRAM OF RIGHT BREAST: Primary | ICD-10-CM

## 2024-11-25 ENCOUNTER — OFFICE VISIT (OUTPATIENT)
Facility: CLINIC | Age: 44
End: 2024-11-25
Payer: COMMERCIAL

## 2024-11-25 VITALS
RESPIRATION RATE: 20 BRPM | DIASTOLIC BLOOD PRESSURE: 78 MMHG | HEART RATE: 73 BPM | SYSTOLIC BLOOD PRESSURE: 113 MMHG | BODY MASS INDEX: 27.26 KG/M2 | OXYGEN SATURATION: 96 % | WEIGHT: 169.6 LBS | HEIGHT: 66 IN | TEMPERATURE: 97.2 F

## 2024-11-25 DIAGNOSIS — E78.00 PURE HYPERCHOLESTEROLEMIA: Primary | ICD-10-CM

## 2024-11-25 DIAGNOSIS — Z71.3 WEIGHT LOSS COUNSELING, ENCOUNTER FOR: ICD-10-CM

## 2024-11-25 DIAGNOSIS — E66.3 OVERWEIGHT (BMI 25.0-29.9): Chronic | ICD-10-CM

## 2024-11-25 PROCEDURE — 99213 OFFICE O/P EST LOW 20 MIN: CPT | Performed by: STUDENT IN AN ORGANIZED HEALTH CARE EDUCATION/TRAINING PROGRAM

## 2024-11-25 SDOH — ECONOMIC STABILITY: FOOD INSECURITY: WITHIN THE PAST 12 MONTHS, YOU WORRIED THAT YOUR FOOD WOULD RUN OUT BEFORE YOU GOT MONEY TO BUY MORE.: NEVER TRUE

## 2024-11-25 SDOH — ECONOMIC STABILITY: FOOD INSECURITY: WITHIN THE PAST 12 MONTHS, THE FOOD YOU BOUGHT JUST DIDN'T LAST AND YOU DIDN'T HAVE MONEY TO GET MORE.: NEVER TRUE

## 2024-11-25 SDOH — ECONOMIC STABILITY: INCOME INSECURITY: HOW HARD IS IT FOR YOU TO PAY FOR THE VERY BASICS LIKE FOOD, HOUSING, MEDICAL CARE, AND HEATING?: NOT HARD AT ALL

## 2024-11-25 ASSESSMENT — PATIENT HEALTH QUESTIONNAIRE - PHQ9
5. POOR APPETITE OR OVEREATING: NOT AT ALL
6. FEELING BAD ABOUT YOURSELF - OR THAT YOU ARE A FAILURE OR HAVE LET YOURSELF OR YOUR FAMILY DOWN: NOT AT ALL
SUM OF ALL RESPONSES TO PHQ QUESTIONS 1-9: 0
7. TROUBLE CONCENTRATING ON THINGS, SUCH AS READING THE NEWSPAPER OR WATCHING TELEVISION: NOT AT ALL
1. LITTLE INTEREST OR PLEASURE IN DOING THINGS: NOT AT ALL
SUM OF ALL RESPONSES TO PHQ QUESTIONS 1-9: 0
SUM OF ALL RESPONSES TO PHQ9 QUESTIONS 1 & 2: 0
3. TROUBLE FALLING OR STAYING ASLEEP: NOT AT ALL
4. FEELING TIRED OR HAVING LITTLE ENERGY: NOT AT ALL
10. IF YOU CHECKED OFF ANY PROBLEMS, HOW DIFFICULT HAVE THESE PROBLEMS MADE IT FOR YOU TO DO YOUR WORK, TAKE CARE OF THINGS AT HOME, OR GET ALONG WITH OTHER PEOPLE: NOT DIFFICULT AT ALL
SUM OF ALL RESPONSES TO PHQ QUESTIONS 1-9: 0
2. FEELING DOWN, DEPRESSED OR HOPELESS: NOT AT ALL
8. MOVING OR SPEAKING SO SLOWLY THAT OTHER PEOPLE COULD HAVE NOTICED. OR THE OPPOSITE, BEING SO FIGETY OR RESTLESS THAT YOU HAVE BEEN MOVING AROUND A LOT MORE THAN USUAL: NOT AT ALL
9. THOUGHTS THAT YOU WOULD BE BETTER OFF DEAD, OR OF HURTING YOURSELF: NOT AT ALL
SUM OF ALL RESPONSES TO PHQ QUESTIONS 1-9: 0

## 2024-11-25 ASSESSMENT — ANXIETY QUESTIONNAIRES
GAD7 TOTAL SCORE: 0
4. TROUBLE RELAXING: NOT AT ALL
3. WORRYING TOO MUCH ABOUT DIFFERENT THINGS: NOT AT ALL
2. NOT BEING ABLE TO STOP OR CONTROL WORRYING: NOT AT ALL
7. FEELING AFRAID AS IF SOMETHING AWFUL MIGHT HAPPEN: NOT AT ALL
6. BECOMING EASILY ANNOYED OR IRRITABLE: NOT AT ALL
5. BEING SO RESTLESS THAT IT IS HARD TO SIT STILL: NOT AT ALL
1. FEELING NERVOUS, ANXIOUS, OR ON EDGE: NOT AT ALL
IF YOU CHECKED OFF ANY PROBLEMS ON THIS QUESTIONNAIRE, HOW DIFFICULT HAVE THESE PROBLEMS MADE IT FOR YOU TO DO YOUR WORK, TAKE CARE OF THINGS AT HOME, OR GET ALONG WITH OTHER PEOPLE: NOT DIFFICULT AT ALL

## 2024-11-25 NOTE — ASSESSMENT & PLAN NOTE
Pt able to get Wegovy and on 0.25 mg/wk and tolerating well, but struggling to get next dose.  Discussed diet.   diet and exercise.  Return in 3 months for status.

## 2024-11-25 NOTE — PROGRESS NOTES
Chronic Disease Follow up    Marilee Howe (: 1980) is a 44 y.o. female here for evaluation of the following chief concerns(s):  Follow-up (4 week f/u, weight management )       ASSESSMENT/PLAN:  1. Pure hypercholesterolemia  Assessment & Plan:   Pt tolerating Metamucil well and is change diet to help.  Pt able to get Wegovy and have been on the 0.25 mg.  Tolerating well with minor nausea.  Transitioning to 0.5 mg weekly, however, there is no supply for  0.5 mg dose.   Discuss option of higher dose, 1mg.  Pt would like to try.  She will check supply with different pharmacy and let us know.   Will check her lipid in 6 months. Continue with Wegovy.   Orders:  -     Semaglutide-Weight Management (WEGOVY) 1 MG/0.5ML SOAJ SC injection; Inject 1 mg into the skin every 7 days, Disp-4 mL, R-0Normal  2. Overweight (BMI 25.0-29.9)  Assessment & Plan:  Pt able to get Wegovy and on 0.25 mg/wk and tolerating well, but struggling to get next dose.  Discussed diet.   diet and exercise.  Return in 3 months for status.   Orders:  -     Semaglutide-Weight Management (WEGOVY) 1 MG/0.5ML SOAJ SC injection; Inject 1 mg into the skin every 7 days, Disp-4 mL, R-0Normal  3. Weight loss counseling, encounter for  Assessment & Plan:   Counseled dietary changes as it is the foundation of weight issue.  And that even if she is on Wegovy, she needs to change lifestyle to ensure minimal rebound when medication stopped when she reach target weight and cholesterol.    Orders:  -     Semaglutide-Weight Management (WEGOVY) 1 MG/0.5ML SOAJ SC injection; Inject 1 mg into the skin every 7 days, Disp-4 mL, R-0Normal    No orders of the defined types were placed in this encounter.      Return in about 3 months (around 2025), or Weight counseling and Wegovy adjustment.    Patient agrees with plan as above and has no additional questions at this time.     SUBJECTIVE/OBJECTIVE:    Patient presents for follow up chronic disease

## 2024-11-25 NOTE — ASSESSMENT & PLAN NOTE
Pt tolerating Metamucil well and is change diet to help.  Pt able to get Wegovy and have been on the 0.25 mg.  Tolerating well with minor nausea.  Transitioning to 0.5 mg weekly, however, there is no supply for  0.5 mg dose.   Discuss option of higher dose, 1mg.  Pt would like to try.  She will check supply with different pharmacy and let us know.   Will check her lipid in 6 months. Continue with Wegovy.

## 2024-11-25 NOTE — ASSESSMENT & PLAN NOTE
Counseled dietary changes as it is the foundation of weight issue.  And that even if she is on Wegovy, she needs to change lifestyle to ensure minimal rebound when medication stopped when she reach target weight and cholesterol.

## 2024-11-25 NOTE — PROGRESS NOTES
Marilee Howe presents today for   Chief Complaint   Patient presents with    Follow-up     4 week f/u         Is someone accompanying this pt? Yes/spouse    Is the patient using any DME equipment during OV? no    Depression Screenin/25/2024     8:39 AM 2024     8:28 AM 10/29/2024     1:59 PM 10/22/2024    10:29 AM   PHQ-9 Questionaire   Little interest or pleasure in doing things 0 0 0 0   Feeling down, depressed, or hopeless 0 0 0 0   Trouble falling or staying asleep, or sleeping too much 0 0     Feeling tired or having little energy 0 0     Poor appetite or overeating 0 0     Feeling bad about yourself - or that you are a failure or have let yourself or your family down 0 0     Trouble concentrating on things, such as reading the newspaper or watching television 0 0     Moving or speaking so slowly that other people could have noticed. Or the opposite - being so fidgety or restless that you have been moving around a lot more than usual 0 0     Thoughts that you would be better off dead, or of hurting yourself in some way 0 0     PHQ-9 Total Score 0 0 0 0   If you checked off any problems, how difficult have these problems made it for you to do your work, take care of things at home, or get along with other people? 0 0          BRADEN 7-Anxiety       2024     8:39 AM 2024     8:29 AM 10/29/2024     1:59 PM 10/22/2024    10:29 AM   BRADEN-7 SCREENING   Feeling nervous, anxious, or on edge Not at all Not at all Not at all Not at all   Not being able to stop or control worrying Not at all Not at all Not at all Not at all   Worrying too much about different things Not at all Not at all Several days Not at all   Trouble relaxing Not at all Not at all Nearly every day Not at all   Being so restless that it is hard to sit still Not at all Not at all Not at all Not at all   Becoming easily annoyed or irritable Not at all Not at all Several days Not at all   Feeling afraid as if something awful

## 2024-12-10 DIAGNOSIS — E66.3 OVERWEIGHT (BMI 25.0-29.9): Primary | ICD-10-CM

## 2024-12-10 DIAGNOSIS — E78.00 PURE HYPERCHOLESTEROLEMIA: ICD-10-CM

## 2024-12-10 DIAGNOSIS — Z71.3 WEIGHT LOSS COUNSELING, ENCOUNTER FOR: ICD-10-CM

## 2024-12-12 DIAGNOSIS — E66.3 OVERWEIGHT (BMI 25.0-29.9): ICD-10-CM

## 2024-12-12 DIAGNOSIS — Z71.3 WEIGHT LOSS COUNSELING, ENCOUNTER FOR: ICD-10-CM

## 2024-12-12 DIAGNOSIS — E78.00 PURE HYPERCHOLESTEROLEMIA: ICD-10-CM

## 2025-01-09 DIAGNOSIS — E78.00 PURE HYPERCHOLESTEROLEMIA: ICD-10-CM

## 2025-01-09 DIAGNOSIS — Z71.3 WEIGHT LOSS COUNSELING, ENCOUNTER FOR: ICD-10-CM

## 2025-01-09 DIAGNOSIS — E66.3 OVERWEIGHT (BMI 25.0-29.9): Primary | ICD-10-CM

## 2025-02-16 DIAGNOSIS — E66.3 OVERWEIGHT (BMI 25.0-29.9): ICD-10-CM

## 2025-02-16 DIAGNOSIS — Z71.3 WEIGHT LOSS COUNSELING, ENCOUNTER FOR: ICD-10-CM

## 2025-02-16 DIAGNOSIS — E78.00 PURE HYPERCHOLESTEROLEMIA: ICD-10-CM

## 2025-02-17 RX ORDER — SEMAGLUTIDE 2.4 MG/.75ML
2.4 INJECTION, SOLUTION SUBCUTANEOUS
Qty: 3 ML | Refills: 0 | Status: SHIPPED | OUTPATIENT
Start: 2025-02-17 | End: 2025-03-17

## 2025-02-17 NOTE — TELEPHONE ENCOUNTER
Last seen 11/25/2024   Last labs 10/22/2024  Last filled  1/9/2025  Next appointment 2/25/2025     Lab Results   Component Value Date     (L) 10/22/2024    K 4.0 10/22/2024     10/22/2024    CO2 27 10/22/2024    BUN 11 10/22/2024    CREATININE 0.75 10/22/2024    GLUCOSE 95 10/22/2024    CALCIUM 9.6 10/22/2024    BILITOT 0.5 10/22/2024    ALKPHOS 41 (L) 10/22/2024    AST 15 10/22/2024    ALT 24 10/22/2024    LABGLOM >90 10/22/2024    GFRAA >60 10/02/2020    GLOB 4.1 (H) 10/22/2024

## 2025-02-25 ENCOUNTER — OFFICE VISIT (OUTPATIENT)
Facility: CLINIC | Age: 45
End: 2025-02-25

## 2025-02-25 VITALS
HEIGHT: 66 IN | BODY MASS INDEX: 24.2 KG/M2 | TEMPERATURE: 97.3 F | HEART RATE: 89 BPM | OXYGEN SATURATION: 98 % | WEIGHT: 150.6 LBS | RESPIRATION RATE: 20 BRPM | SYSTOLIC BLOOD PRESSURE: 123 MMHG | DIASTOLIC BLOOD PRESSURE: 68 MMHG

## 2025-02-25 DIAGNOSIS — E78.00 PURE HYPERCHOLESTEROLEMIA: ICD-10-CM

## 2025-02-25 DIAGNOSIS — E55.9 VITAMIN D DEFICIENCY: ICD-10-CM

## 2025-02-25 DIAGNOSIS — E66.3 OVERWEIGHT (BMI 25.0-29.9): ICD-10-CM

## 2025-02-25 DIAGNOSIS — R92.8 ABNORMAL MAMMOGRAM: ICD-10-CM

## 2025-02-25 DIAGNOSIS — Z71.3 WEIGHT LOSS COUNSELING, ENCOUNTER FOR: Primary | ICD-10-CM

## 2025-02-25 DIAGNOSIS — R15.0 INCOMPLETE DEFECATION: ICD-10-CM

## 2025-02-25 RX ORDER — SEMAGLUTIDE 2.4 MG/.75ML
2.4 INJECTION, SOLUTION SUBCUTANEOUS
Qty: 3 ML | Refills: 0 | Status: SHIPPED | OUTPATIENT
Start: 2025-02-25 | End: 2025-03-25

## 2025-02-25 SDOH — ECONOMIC STABILITY: FOOD INSECURITY: WITHIN THE PAST 12 MONTHS, THE FOOD YOU BOUGHT JUST DIDN'T LAST AND YOU DIDN'T HAVE MONEY TO GET MORE.: NEVER TRUE

## 2025-02-25 SDOH — ECONOMIC STABILITY: FOOD INSECURITY: WITHIN THE PAST 12 MONTHS, YOU WORRIED THAT YOUR FOOD WOULD RUN OUT BEFORE YOU GOT MONEY TO BUY MORE.: NEVER TRUE

## 2025-02-25 ASSESSMENT — PATIENT HEALTH QUESTIONNAIRE - PHQ9
1. LITTLE INTEREST OR PLEASURE IN DOING THINGS: NOT AT ALL
SUM OF ALL RESPONSES TO PHQ QUESTIONS 1-9: 0
SUM OF ALL RESPONSES TO PHQ QUESTIONS 1-9: 0
2. FEELING DOWN, DEPRESSED OR HOPELESS: NOT AT ALL
SUM OF ALL RESPONSES TO PHQ9 QUESTIONS 1 & 2: 0
SUM OF ALL RESPONSES TO PHQ QUESTIONS 1-9: 0
SUM OF ALL RESPONSES TO PHQ QUESTIONS 1-9: 0

## 2025-02-25 ASSESSMENT — ANXIETY QUESTIONNAIRES
GAD7 TOTAL SCORE: 0
4. TROUBLE RELAXING: NOT AT ALL
3. WORRYING TOO MUCH ABOUT DIFFERENT THINGS: NOT AT ALL
5. BEING SO RESTLESS THAT IT IS HARD TO SIT STILL: NOT AT ALL
6. BECOMING EASILY ANNOYED OR IRRITABLE: NOT AT ALL
1. FEELING NERVOUS, ANXIOUS, OR ON EDGE: NOT AT ALL
7. FEELING AFRAID AS IF SOMETHING AWFUL MIGHT HAPPEN: NOT AT ALL
IF YOU CHECKED OFF ANY PROBLEMS ON THIS QUESTIONNAIRE, HOW DIFFICULT HAVE THESE PROBLEMS MADE IT FOR YOU TO DO YOUR WORK, TAKE CARE OF THINGS AT HOME, OR GET ALONG WITH OTHER PEOPLE: NOT DIFFICULT AT ALL
2. NOT BEING ABLE TO STOP OR CONTROL WORRYING: NOT AT ALL

## 2025-02-25 NOTE — PROGRESS NOTES
Marilee Howe presents today for   Chief Complaint   Patient presents with    Follow-up     3 month f/u         Is someone accompanying this pt? no    Is the patient using any DME equipment during OV? no    Depression Screenin/25/2024     8:39 AM 2024     8:28 AM 10/29/2024     1:59 PM 10/22/2024    10:29 AM   PHQ-9 Questionaire   Little interest or pleasure in doing things 0 0 0 0   Feeling down, depressed, or hopeless 0 0 0 0   Trouble falling or staying asleep, or sleeping too much 0 0     Feeling tired or having little energy 0 0     Poor appetite or overeating 0 0     Feeling bad about yourself - or that you are a failure or have let yourself or your family down 0 0     Trouble concentrating on things, such as reading the newspaper or watching television 0 0     Moving or speaking so slowly that other people could have noticed. Or the opposite - being so fidgety or restless that you have been moving around a lot more than usual 0 0     Thoughts that you would be better off dead, or of hurting yourself in some way 0 0     PHQ-9 Total Score 0 0 0 0   If you checked off any problems, how difficult have these problems made it for you to do your work, take care of things at home, or get along with other people? 0 0          BRADEN 7-Anxiety       2024     8:39 AM 2024     8:29 AM 10/29/2024     1:59 PM 10/22/2024    10:29 AM   BRADEN-7 SCREENING   Feeling nervous, anxious, or on edge Not at all Not at all Not at all Not at all   Not being able to stop or control worrying Not at all Not at all Not at all Not at all   Worrying too much about different things Not at all Not at all Several days Not at all   Trouble relaxing Not at all Not at all Nearly every day Not at all   Being so restless that it is hard to sit still Not at all Not at all Not at all Not at all   Becoming easily annoyed or irritable Not at all Not at all Several days Not at all   Feeling afraid as if something awful might

## 2025-02-26 PROBLEM — E66.3 OVERWEIGHT (BMI 25.0-29.9): Chronic | Status: RESOLVED | Noted: 2024-11-01 | Resolved: 2025-02-26

## 2025-02-27 NOTE — PROGRESS NOTES
Chronic Disease Follow up    Marilee Howe (: 1980) is a 44 y.o. female here for evaluation of the following chief concerns(s):  Follow-up (3 month f/u)       ASSESSMENT/PLAN:  1. Weight loss counseling, encounter for  Assessment & Plan:   Pt on Wegovy and doing well with her weight.  BMI in normal range.  Pt also have improved on her diet.  Pt would like to continue to reach target weight of 135.    Orders:  -     Semaglutide-Weight Management (WEGOVY) 2.4 MG/0.75ML SOAJ SC injection; Inject 2.4 mg into the skin every 7 days for 28 days, Disp-3 mL, R-0Normal  2. Pure hypercholesterolemia  Assessment & Plan:   Pt have loss about 21 lbs on ozempic  Likely improve Lipid; repeat to evaluate.    Orders:  -     Lipid Panel; Future  -     Semaglutide-Weight Management (WEGOVY) 2.4 MG/0.75ML SOAJ SC injection; Inject 2.4 mg into the skin every 7 days for 28 days, Disp-3 mL, R-0Normal  3. Vitamin D deficiency  Comments:  Pt have been taking otc 39233 U supplement over 12 weeks..  Repeat lab to evaluate  Orders:  -     Vitamin D 25 Hydroxy; Future  4. Incomplete defecation  Comments:  Improve with change in diet.  Taking miralax prn.  Advised metamucil as well  5. Overweight (BMI 25.0-29.9)  Comments:  BMI normal now,  Orders:  -     Semaglutide-Weight Management (WEGOVY) 2.4 MG/0.75ML SOAJ SC injection; Inject 2.4 mg into the skin every 7 days for 28 days, Disp-3 mL, R-0Normal  6. Abnormal mammogram  Comments:  incomplete exam, requiring further diagnostic testing.  Order given, waiting on scheduling.  Pt needs to do it a [facility] unable to do    Orders Placed This Encounter   Procedures    Lipid Panel     Standing Status:   Future     Standing Expiration Date:   2026    Vitamin D 25 Hydroxy     Standing Status:   Future     Standing Expiration Date:   2026     .    Return in about 3 months (around 2025) for 15 minutes.    Patient agrees with plan as above and has no

## 2025-02-27 NOTE — ASSESSMENT & PLAN NOTE
Pt on Wegovy and doing well with her weight.  BMI in normal range.  Pt also have improved on her diet.  Pt would like to continue to reach target weight of 135.

## 2025-04-01 ENCOUNTER — HOSPITAL ENCOUNTER (OUTPATIENT)
Facility: HOSPITAL | Age: 45
Discharge: HOME | End: 2025-04-04
Attending: STUDENT IN AN ORGANIZED HEALTH CARE EDUCATION/TRAINING PROGRAM
Payer: COMMERCIAL

## 2025-04-01 VITALS — HEIGHT: 66 IN | BODY MASS INDEX: 24.32 KG/M2

## 2025-04-01 DIAGNOSIS — R92.8 ABNORMAL MAMMOGRAM OF RIGHT BREAST: ICD-10-CM

## 2025-04-01 PROCEDURE — G0279 TOMOSYNTHESIS, MAMMO: HCPCS

## 2025-04-09 DIAGNOSIS — Z71.3 WEIGHT LOSS COUNSELING, ENCOUNTER FOR: ICD-10-CM

## 2025-04-09 DIAGNOSIS — E66.3 OVERWEIGHT (BMI 25.0-29.9): ICD-10-CM

## 2025-04-09 DIAGNOSIS — E78.00 PURE HYPERCHOLESTEROLEMIA: ICD-10-CM

## 2025-04-09 RX ORDER — SEMAGLUTIDE 2.4 MG/.75ML
2.4 INJECTION, SOLUTION SUBCUTANEOUS
Qty: 3 ML | Refills: 0 | Status: SHIPPED | OUTPATIENT
Start: 2025-04-09 | End: 2025-05-07

## 2025-04-28 ENCOUNTER — OFFICE VISIT (OUTPATIENT)
Facility: CLINIC | Age: 45
End: 2025-04-28
Payer: COMMERCIAL

## 2025-04-28 VITALS
HEIGHT: 66 IN | HEART RATE: 77 BPM | WEIGHT: 138.6 LBS | RESPIRATION RATE: 20 BRPM | DIASTOLIC BLOOD PRESSURE: 74 MMHG | SYSTOLIC BLOOD PRESSURE: 120 MMHG | TEMPERATURE: 98 F | BODY MASS INDEX: 22.28 KG/M2 | OXYGEN SATURATION: 99 %

## 2025-04-28 DIAGNOSIS — E78.00 PURE HYPERCHOLESTEROLEMIA: ICD-10-CM

## 2025-04-28 DIAGNOSIS — Z71.3 WEIGHT LOSS COUNSELING, ENCOUNTER FOR: Primary | ICD-10-CM

## 2025-04-28 PROCEDURE — 99213 OFFICE O/P EST LOW 20 MIN: CPT | Performed by: STUDENT IN AN ORGANIZED HEALTH CARE EDUCATION/TRAINING PROGRAM

## 2025-04-28 SDOH — ECONOMIC STABILITY: FOOD INSECURITY: WITHIN THE PAST 12 MONTHS, YOU WORRIED THAT YOUR FOOD WOULD RUN OUT BEFORE YOU GOT MONEY TO BUY MORE.: NEVER TRUE

## 2025-04-28 SDOH — ECONOMIC STABILITY: FOOD INSECURITY: WITHIN THE PAST 12 MONTHS, THE FOOD YOU BOUGHT JUST DIDN'T LAST AND YOU DIDN'T HAVE MONEY TO GET MORE.: NEVER TRUE

## 2025-04-28 ASSESSMENT — ANXIETY QUESTIONNAIRES
3. WORRYING TOO MUCH ABOUT DIFFERENT THINGS: NOT AT ALL
4. TROUBLE RELAXING: NOT AT ALL
6. BECOMING EASILY ANNOYED OR IRRITABLE: NOT AT ALL
GAD7 TOTAL SCORE: 0
1. FEELING NERVOUS, ANXIOUS, OR ON EDGE: NOT AT ALL
IF YOU CHECKED OFF ANY PROBLEMS ON THIS QUESTIONNAIRE, HOW DIFFICULT HAVE THESE PROBLEMS MADE IT FOR YOU TO DO YOUR WORK, TAKE CARE OF THINGS AT HOME, OR GET ALONG WITH OTHER PEOPLE: NOT DIFFICULT AT ALL
7. FEELING AFRAID AS IF SOMETHING AWFUL MIGHT HAPPEN: NOT AT ALL
2. NOT BEING ABLE TO STOP OR CONTROL WORRYING: NOT AT ALL
5. BEING SO RESTLESS THAT IT IS HARD TO SIT STILL: NOT AT ALL

## 2025-04-28 ASSESSMENT — PATIENT HEALTH QUESTIONNAIRE - PHQ9
SUM OF ALL RESPONSES TO PHQ QUESTIONS 1-9: 0
SUM OF ALL RESPONSES TO PHQ QUESTIONS 1-9: 0
2. FEELING DOWN, DEPRESSED OR HOPELESS: NOT AT ALL
SUM OF ALL RESPONSES TO PHQ QUESTIONS 1-9: 0
1. LITTLE INTEREST OR PLEASURE IN DOING THINGS: NOT AT ALL
SUM OF ALL RESPONSES TO PHQ QUESTIONS 1-9: 0

## 2025-04-28 NOTE — PROGRESS NOTES
Follow up     Marilee Howe (: 1980) is a 45 y.o. female here for evaluation of the following chief concerns(s):  Follow-up (Weight loss management )       ASSESSMENT/PLAN:    Assessment & Plan  1. Weight management/hypercholesterolemia   - Weight has decreased from 150 to 138 pounds, with a goal weight of 130 pounds.  - Currently on Wegovy 2.4 mg injections per week, with 4 doses remaining.  - Significant dietary modifications have been made, including the adoption of a vegetarian diet.  - BMI has improved from 27 to 22.  - PA for Wegovy to  on May 4th 2025  - Advised to continue Wegovy 2.4 mg injections per week for an additional month.  - A tapering course will be initiated if approved by insurance.  - Encouraged to maintain dietary changes and exercise routine.  - Laboratory tests can be conducted at her convenience for updated lipid    Follow-up  The patient will follow up in 8 weeks.      Return in about 8 weeks (around 2025) for 15 minutes.    Patient agrees with plan as above and has no additional questions at this time.     SUBJECTIVE/OBJECTIVE:    History of Present Illness  The patient presents for a follow-up on weight management.    The chief complaint is weight management. Currently, she is on Wegovy 2.4 mg injections per week and is tolerating it well. There are 4 doses remaining of her current medication. Significant weight loss has been achieved, with her current weight at 138 pounds, nearing her goal of 130 pounds. Concerns are expressed about potential weight gain upon discontinuation of the medication. Dietary modifications have been made, including the adoption of a vegetarian diet to support her partner's management of gout.        Vitals:    25 1618   BP: 120/74   BP Site: Left Upper Arm   Patient Position: Sitting   BP Cuff Size: Medium Adult   Pulse: 77   Resp: 20   Temp: 98 °F (36.7 °C)   TempSrc: Temporal   SpO2: 99%   Weight: 62.9 kg (138 lb 9.6 oz)   Height: 
other health care providers outside of [facility] since your last visit?”    NO        “Have you had a pap smear?”    NO    Date of last Cervical Cancer screen (HPV or PAP): 6/16/2011